# Patient Record
Sex: FEMALE | Race: WHITE | Employment: FULL TIME | URBAN - METROPOLITAN AREA
[De-identification: names, ages, dates, MRNs, and addresses within clinical notes are randomized per-mention and may not be internally consistent; named-entity substitution may affect disease eponyms.]

---

## 2017-10-31 PROCEDURE — 88175 CYTOPATH C/V AUTO FLUID REDO: CPT | Performed by: OBSTETRICS & GYNECOLOGY

## 2018-12-18 PROCEDURE — 88175 CYTOPATH C/V AUTO FLUID REDO: CPT | Performed by: OBSTETRICS & GYNECOLOGY

## 2024-03-18 NOTE — DISCHARGE INSTRUCTIONS
HOME INSTRUCTIONS    Hysteroscopy  Hysteroscopy is a procedure done to see inside your uterus. It can help find the cause of problems in the uterus. This helps your healthcare provider decide on the best treatment. In some cases, it can be used to perform treatment. Hysteroscopy may be done in your healthcare provider's office, outpatient surgery center, or in the hospital.    Why might I need hysteroscopy?  Hysteroscopy may be done based on the results of other tests. It can help find the cause of problems. These can include:  Unusually heavy or long menstrual periods  Bleeding between periods  Postmenopausal bleeding  Trouble becoming pregnant (infertility) or carrying a pregnancy to term  To locate an IUD (intrauterine device)  What are the risks and possible complications of hysteroscopy?  Problems with the procedure are rare. But all procedures have risks. Risks of hysteroscopy include:  Infection  Bleeding  Tearing of the uterine wall  Damage to internal organs  Scarring of the uterus  Fluid overload  Problems with anesthesia, the medicine that prevents pain during the procedure  How do I get ready for hysteroscopy?  Tell your healthcare provider if you have any health problems. These include diabetes, high blood pressure, heart disease, or bleeding problems.  Tell your healthcare provider about all the medicines you take. This includes any over-the-counter medicines, prescription medicines, vitamins, herbs, or supplements.  You may be told not to use vaginal creams or medicine. And you may be told not to have sex or wash out your vagina (douche).  Follow any directions you are given for not eating or drinking before procedure.  You may be tested for pregnancy and infection.  You may be asked to sign a consent form.  You may be given a pain reliever to take an hour before the procedure. This helps relieve cramping that may occur.    What happens during a hysteroscopy?  You’ll lie on an exam table with your  feet in stirrups.  You may be given general anesthesia or medicines to help you relax or sleep. In some cases, an IV (intravenous) line will be put into a vein in your arm or hand. This line is then used to give fluids and medicines.  A tool called a speculum is inserted into the vagina to hold it open. A tool called a dilator may be used to widen the cervix.  Numbing medicine may be applied to the cervix.  A long, thin lighted tube with a camera attached (hysteroscope) is inserted through the vagina and into the uterus. It's used to see inside the uterus. Images of the uterus are viewed on a monitor.  A gas or fluid may be injected into the uterus to expand it.  Other tools may be put through the hysteroscope. These are used to take tissue samples or remove growths.    What happens after hysteroscopy?  You may have cramps and bleeding for short time after the procedure. This is normal. Use pads instead of tampons.  Don't douche or use tampons until your healthcare provider says it’s OK.  Don't use any vaginal medicines until you are told it’s OK.  Ask your healthcare provider when it’s OK to have sex again.    When to call your healthcare provider  Call your healthcare provider if any of the following occur:  Heavy bleeding (more than 1 pad an hour for 2 or more hours)  A fever of 100.4°F ( 38.0°C) or higher, or as directed by your provider  Increasing belly (abdominal) pain or soreness  Bad-smelling discharge  Follow-up care  Schedule a follow-up visit with your healthcare provider. Based on your test results, you may need more treatment. Be sure to follow directions and keep your appointments.  Imindi last reviewed this educational content on 8/1/2022 © 2000-2023 [employer]. All rights reserved. This information is not intended as a substitute for professional medical care. Always follow your healthcare professional's instructions.        AMBSURG HOME CARE INSTRUCTIONS: POST-OP ANESTHESIA  The  medication that you received for sedation or general anesthesia can last up to 24 hours. Your judgment and reflexes may be altered, even if you feel like your normal self.      We Recommend:   Do not drive any motor vehicle or bicycle   Avoid mowing the lawn, playing sports, or working with power tools/applicances (power saws, electric knives or mixers)   That you have someone stay with you on your first night home   Do not drink alcohol or take sleeping pills or tranquilizers   Do not sign legal documents within 24 hours of your procedure   If you had a nerve block for your surgery, take extra care not to put any pressure on your arm or hand for 24 hours    It is normal:  For you to have a sore throat if you had a breathing tube during surgery (while you were asleep!). The sore throat should get better within 48 hours. You can gargle with warm salt water (1/2 tsp in 4 oz warm water) or use a throat lozenge for comfort  To feel muscle aches or soreness especially in the abdomen, chest or neck. The achy feeling should go away in the next 24 hours  To feel weak, sleepy or \"wiped out\". Your should start feeling better in the next 24 hours.   To experience mild discomforts such as sore lip or tongue, headache, cramps, gas pains or a bloated feeling in your abdomen.   To experience mild back pain or soreness for a day or two if you had spinal or epidural anesthesia.   If you had laparoscopic surgery, to feel shoulder pain or discomfort on the day of surgery.   For some patients to have nausea after surgery/anesthesia    If you feel nausea or experience vomiting:   Try to move around less.   Eat less than usual or drink only liquids until the next morning   Nausea should resolve in about 24 hours    If you have a problem when you are at home:    Call your surgeons office   Discharge Instructions: After Your Surgery  You’ve just had surgery. During surgery, you were given medicine called anesthesia to keep you relaxed and  free of pain. After surgery, you may have some pain or nausea. This is common. Here are some tips for feeling better and getting well after surgery.   Going home  Your healthcare provider will show you how to take care of yourself when you go home. They'll also answer your questions. Have an adult family member or friend drive you home. For the first 24 hours after your surgery:   Don't drive or use heavy equipment.  Don't make important decisions or sign legal papers.  Take medicines as directed.  Don't drink alcohol.  Have someone stay with you, if needed. They can watch for problems and help keep you safe.  Be sure to go to all follow-up visits with your healthcare provider. And rest after your surgery for as long as your provider tells you to.   Coping with pain  If you have pain after surgery, pain medicine will help you feel better. Take it as directed, before pain becomes severe. Also, ask your healthcare provider or pharmacist about other ways to control pain. This might be with heat, ice, or relaxation. And follow any other instructions your surgeon or nurse gives you.      Stay on schedule with your medicine.     Tips for taking pain medicine  To get the best relief possible, remember these points:   Pain medicines can upset your stomach. Taking them with a little food may help.  Most pain relievers taken by mouth need at least 20 to 30 minutes to start to work.  Don't wait till your pain becomes severe before you take your medicine. Try to time your medicine so that you can take it before starting an activity. This might be before you get dressed, go for a walk, or sit down for dinner.  Constipation is a common side effect of some pain medicines. Call your healthcare provider before taking any medicines such as laxatives or stool softeners to help ease constipation. Also ask if you should skip any foods. Drinking lots of fluids and eating foods such as fruits and vegetables that are high in fiber can also  help. Remember, don't take laxatives unless your surgeon has prescribed them.  Drinking alcohol and taking pain medicine can cause dizziness and slow your breathing. It can even be deadly. Don't drink alcohol while taking pain medicine.  Pain medicine can make you react more slowly to things. Don't drive or run machinery while taking pain medicine.  Your healthcare provider may tell you to take acetaminophen to help ease your pain. Ask them how much you're supposed to take each day. Acetaminophen or other pain relievers may interact with your prescription medicines or other over-the-counter (OTC) medicines. Some prescription medicines have acetaminophen and other ingredients in them. Using both prescription and OTC acetaminophen for pain can cause you to accidentally overdose. Read the labels on your OTC medicines with care. This will help you to clearly know the list of ingredients, how much to take, and any warnings. It may also help you not take too much acetaminophen. If you have questions or don't understand the information, ask your pharmacist or healthcare provider to explain it to you before you take the OTC medicine.   Managing nausea  Some people have an upset stomach (nausea) after surgery. This is often because of anesthesia, pain, or pain medicine, less movement of food in the stomach, or the stress of surgery. These tips will help you handle nausea and eat healthy foods as you get better. If you were on a special food plan before surgery, ask your healthcare provider if you should follow it while you get better. Check with your provider on how your eating should progress. It may depend on the surgery you had. These general tips may help:   Don't push yourself to eat. Your body will tell you when to eat and how much.  Start off with clear liquids and soup. They're easier to digest.  Next try semi-solid foods as you feel ready. These include mashed potatoes, applesauce, and gelatin.  Slowly move to solid  foods. Don’t eat fatty, rich, or spicy foods at first.  Don't force yourself to have 3 large meals a day. Instead eat smaller amounts more often.  Take pain medicines with a small amount of solid food, such as crackers or toast. This helps prevent nausea.  When to call your healthcare provider  Call your healthcare provider right away if you have any of these:   You still have too much pain, or the pain gets worse, after taking the medicine. The medicine may not be strong enough. Or there may be a complication from the surgery.  You feel too sleepy, dizzy, or groggy. The medicine may be too strong.  Side effects such as nausea or vomiting. Your healthcare provider may advise taking other medicines to .  Skin changes such as rash, itching, or hives. This may mean you have an allergic reaction. Your provider may advise taking other medicines.  The incision looks different (for instance, part of it opens up).  Bleeding or fluid leaking from the incision site, and weren't told to expect that.  Fever of 100.4°F (38°C) or higher, or as directed by your provider.  Call 911  Call 911 right away if you have:   Trouble breathing  Facial swelling    If you have obstructive sleep apnea   You were given anesthesia medicine during surgery to keep you comfortable and free of pain. After surgery, you may have more apnea spells because of this medicine and other medicines you were given. The spells may last longer than normal.    At home:  Keep using the continuous positive airway pressure (CPAP) device when you sleep. Unless your healthcare provider tells you not to, use it when you sleep, day or night. CPAP is a common device used to treat obstructive sleep apnea.  Talk with your provider before taking any pain medicine, muscle relaxants, or sedatives. Your provider will tell you about the possible dangers of taking these medicines.  Contact your provider if your sleeping changes a lot even when taking medicines as  nacho Leiva last reviewed this educational content on 10/1/2021  © 4869-6199 [employer]. All rights reserved. This information is not intended as a substitute for professional medical care. Always follow your healthcare professional's instructions.

## 2024-03-19 ENCOUNTER — ANESTHESIA (OUTPATIENT)
Dept: SURGERY | Facility: HOSPITAL | Age: 59
End: 2024-03-19
Payer: COMMERCIAL

## 2024-03-19 ENCOUNTER — HOSPITAL ENCOUNTER (OUTPATIENT)
Facility: HOSPITAL | Age: 59
Setting detail: HOSPITAL OUTPATIENT SURGERY
Discharge: HOME | End: 2024-03-19
Attending: OBSTETRICS & GYNECOLOGY | Admitting: OBSTETRICS & GYNECOLOGY
Payer: COMMERCIAL

## 2024-03-19 ENCOUNTER — ANESTHESIA EVENT (OUTPATIENT)
Dept: SURGERY | Facility: HOSPITAL | Age: 59
End: 2024-03-19
Payer: COMMERCIAL

## 2024-03-19 VITALS
WEIGHT: 168 LBS | DIASTOLIC BLOOD PRESSURE: 54 MMHG | HEART RATE: 51 BPM | OXYGEN SATURATION: 100 % | HEIGHT: 66 IN | BODY MASS INDEX: 27 KG/M2 | SYSTOLIC BLOOD PRESSURE: 107 MMHG | TEMPERATURE: 99 F | RESPIRATION RATE: 16 BRPM

## 2024-03-19 DIAGNOSIS — Z98.890 POST-OPERATIVE STATE: Primary | ICD-10-CM

## 2024-03-19 PROCEDURE — 88305 TISSUE EXAM BY PATHOLOGIST: CPT | Performed by: OBSTETRICS & GYNECOLOGY

## 2024-03-19 PROCEDURE — 0UDB8ZX EXTRACTION OF ENDOMETRIUM, VIA NATURAL OR ARTIFICIAL OPENING ENDOSCOPIC, DIAGNOSTIC: ICD-10-PCS | Performed by: OBSTETRICS & GYNECOLOGY

## 2024-03-19 RX ORDER — SODIUM CHLORIDE, SODIUM LACTATE, POTASSIUM CHLORIDE, CALCIUM CHLORIDE 600; 310; 30; 20 MG/100ML; MG/100ML; MG/100ML; MG/100ML
INJECTION, SOLUTION INTRAVENOUS CONTINUOUS
Status: DISCONTINUED | OUTPATIENT
Start: 2024-03-19 | End: 2024-03-19

## 2024-03-19 RX ORDER — HYDROMORPHONE HYDROCHLORIDE 1 MG/ML
0.6 INJECTION, SOLUTION INTRAMUSCULAR; INTRAVENOUS; SUBCUTANEOUS EVERY 5 MIN PRN
Status: DISCONTINUED | OUTPATIENT
Start: 2024-03-19 | End: 2024-03-19

## 2024-03-19 RX ORDER — HYDROMORPHONE HYDROCHLORIDE 1 MG/ML
0.4 INJECTION, SOLUTION INTRAMUSCULAR; INTRAVENOUS; SUBCUTANEOUS EVERY 5 MIN PRN
Status: DISCONTINUED | OUTPATIENT
Start: 2024-03-19 | End: 2024-03-19

## 2024-03-19 RX ORDER — MIDAZOLAM HYDROCHLORIDE 1 MG/ML
INJECTION INTRAMUSCULAR; INTRAVENOUS AS NEEDED
Status: DISCONTINUED | OUTPATIENT
Start: 2024-03-19 | End: 2024-03-19 | Stop reason: SURG

## 2024-03-19 RX ORDER — LIDOCAINE HYDROCHLORIDE 10 MG/ML
INJECTION, SOLUTION EPIDURAL; INFILTRATION; INTRACAUDAL; PERINEURAL AS NEEDED
Status: DISCONTINUED | OUTPATIENT
Start: 2024-03-19 | End: 2024-03-19 | Stop reason: SURG

## 2024-03-19 RX ORDER — NALOXONE HYDROCHLORIDE 0.4 MG/ML
0.08 INJECTION, SOLUTION INTRAMUSCULAR; INTRAVENOUS; SUBCUTANEOUS AS NEEDED
Status: DISCONTINUED | OUTPATIENT
Start: 2024-03-19 | End: 2024-03-19

## 2024-03-19 RX ORDER — ONDANSETRON 2 MG/ML
INJECTION INTRAMUSCULAR; INTRAVENOUS AS NEEDED
Status: DISCONTINUED | OUTPATIENT
Start: 2024-03-19 | End: 2024-03-19 | Stop reason: SURG

## 2024-03-19 RX ORDER — IBUPROFEN 600 MG/1
600 TABLET ORAL EVERY 8 HOURS PRN
Qty: 10 TABLET | Refills: 0 | Status: SHIPPED | OUTPATIENT
Start: 2024-03-19

## 2024-03-19 RX ORDER — HYDROMORPHONE HYDROCHLORIDE 1 MG/ML
0.2 INJECTION, SOLUTION INTRAMUSCULAR; INTRAVENOUS; SUBCUTANEOUS EVERY 5 MIN PRN
Status: DISCONTINUED | OUTPATIENT
Start: 2024-03-19 | End: 2024-03-19

## 2024-03-19 RX ORDER — ACETAMINOPHEN 500 MG
1000 TABLET ORAL ONCE
Status: COMPLETED | OUTPATIENT
Start: 2024-03-19 | End: 2024-03-19

## 2024-03-19 RX ORDER — IBUPROFEN 600 MG/1
600 TABLET ORAL ONCE
Status: DISCONTINUED | OUTPATIENT
Start: 2024-03-19 | End: 2024-03-19

## 2024-03-19 RX ORDER — MORPHINE SULFATE 4 MG/ML
2 INJECTION, SOLUTION INTRAMUSCULAR; INTRAVENOUS EVERY 10 MIN PRN
Status: DISCONTINUED | OUTPATIENT
Start: 2024-03-19 | End: 2024-03-19

## 2024-03-19 RX ORDER — DEXAMETHASONE SODIUM PHOSPHATE 4 MG/ML
VIAL (ML) INJECTION AS NEEDED
Status: DISCONTINUED | OUTPATIENT
Start: 2024-03-19 | End: 2024-03-19 | Stop reason: SURG

## 2024-03-19 RX ORDER — HYDROCODONE BITARTRATE AND ACETAMINOPHEN 5; 325 MG/1; MG/1
1-2 TABLET ORAL EVERY 6 HOURS PRN
Qty: 5 TABLET | Refills: 0 | Status: SHIPPED | OUTPATIENT
Start: 2024-03-19

## 2024-03-19 RX ORDER — MORPHINE SULFATE 10 MG/ML
6 INJECTION, SOLUTION INTRAMUSCULAR; INTRAVENOUS EVERY 10 MIN PRN
Status: DISCONTINUED | OUTPATIENT
Start: 2024-03-19 | End: 2024-03-19

## 2024-03-19 RX ORDER — MORPHINE SULFATE 4 MG/ML
4 INJECTION, SOLUTION INTRAMUSCULAR; INTRAVENOUS EVERY 10 MIN PRN
Status: DISCONTINUED | OUTPATIENT
Start: 2024-03-19 | End: 2024-03-19

## 2024-03-19 RX ORDER — KETOROLAC TROMETHAMINE 30 MG/ML
INJECTION, SOLUTION INTRAMUSCULAR; INTRAVENOUS AS NEEDED
Status: DISCONTINUED | OUTPATIENT
Start: 2024-03-19 | End: 2024-03-19 | Stop reason: SURG

## 2024-03-19 RX ADMIN — KETOROLAC TROMETHAMINE 30 MG: 30 INJECTION, SOLUTION INTRAMUSCULAR; INTRAVENOUS at 10:55:00

## 2024-03-19 RX ADMIN — LIDOCAINE HYDROCHLORIDE 25 MG: 10 INJECTION, SOLUTION EPIDURAL; INFILTRATION; INTRACAUDAL; PERINEURAL at 10:30:00

## 2024-03-19 RX ADMIN — MIDAZOLAM HYDROCHLORIDE 2 MG: 1 INJECTION INTRAMUSCULAR; INTRAVENOUS at 10:27:00

## 2024-03-19 RX ADMIN — DEXAMETHASONE SODIUM PHOSPHATE 4 MG: 4 MG/ML VIAL (ML) INJECTION at 10:48:00

## 2024-03-19 RX ADMIN — ONDANSETRON 4 MG: 2 INJECTION INTRAMUSCULAR; INTRAVENOUS at 10:48:00

## 2024-03-19 NOTE — H&P
[facility]  part of [facility]    History & Physical    Rhina Rubio Patient Status:  Hospital Outpatient Surgery    1965 MRN [id number]   Location [facility] PRE OP RECOVERY Attending Rubi Crowder MD   Hosp Day # 0 PCP Shannon Valencia MD     Date:  3/19/2024    History provided by:patient    Chief Complaint:   PMB      HPI:   Rhina Rbuio is a(n) 59 year old  female Patient's last menstrual period was 10/01/2015 (exact date). who presents with PMB for D&C/hysteroscopy/possible polypectomy.      DATE OF SERVICE: 2024  US PELVIS (TRANSABDOMINAL AND TRANSVAGINAL) (CPT=76856/15020)    CLINICAL INDICATION:  PMB (postmenopausal bleeding). LMP:    COMPARISON: None available.    TECHNIQUE: Both transabdominal and transvaginal ultrasound examination of the pelvis were performed.    FINDINGS:    UTERUS:    The uterus is anteverted and measures 5.6 x 3.1 x 2.4 cm. Total volume is 22.2 mL.  The myometrium is heterogeneous. An intramural fundal myoma measures 0.8 x 1.0 x 1.0 cm with no  Doppler flow. It has an ill-defined hypoechoic appearance. If warranted, consider MRI of the uterus  with and without contrast for further evaluation. It was not present on the previous examination.  The endometrial echo complex measures up to 1.4 mm. The endometrium is ill defined with a  questionable hyperechoic focus within it suggesting a possible polyp versus blood products. This  cannot be evaluated any further based on this study and would also benefit by MRI of the uterus with  and without contrast.    RIGHT OVARY:  The right ovary was not visualized.      LEFT OVARY:  The left ovary was not visualized.      There is no significant free fluid.      DATE OF SERVICE: 2024   PROCEDURE: MRI PELVIS (SOFT TISSUE) (W+WO) (CPT=72197)     HISTORY: Postmenopausal bleeding     TECHNIQUE: MR images of the pelvis using axial and coronal T2 HASTE, axial in and out of phase T1   GRE,  axial diffusion and ADC, axial T2 fat suppressed,  high-resolution small field-of-view T2 of   the pelvis, and GRE T1 fat suppressed images prior to and following contrast were obtained per the   \"Female Pelvis\" protocol.     Vial size:  7.5 mL        Injected amount:  7.5 mL          Discarded amount:  0 mL     ADVERSE REACTION: None.     COMPARISON: None.     FINDINGS:     UTERUS: Measures 6.5 x 2.2 x 3.3 cm (vol: 24.69 cc). T2 hypointense myometrial masses compatible   with leiomyomata are not visualized.   ENDOMETRIUM: The endometrial lining is not thickened measuring. There is a subtle ill-defined 0.5 cm   focus of enhancement in the endometrial lining measuring 0.2 cm (series 1101, image 52 and series   1201, image 51). No corresponding diffusion restriction or T2 signal abnormality is identified.   JUNCTIONAL ZONE: Within normal limits.   CERVIX: There is normal preservation of the cervical low signal stroma with no discrete cervical   mass.  Following contrast, there is normal enhancement of the myometrium and cervix.  The   parametrium is normal.   ADNEXA: No suspicious adnexal mass is seen. The bilateral ovaries are visualized.   PERITONEUM:  Precontrast fat suppressed T1 weighted images demonstrate no evidence for hemorrhagic   foci in the pelvis. There is a small volume free fluid in the pelvis.   LYMPH NODES: There are no enlarged pelvic lymph nodes.   BONES: No aggressive-appearing osseous pelvic lesions are seen.     History     Past Medical History:   Diagnosis Date    CERVICAL DYSPLASIA      Past Surgical History:   Procedure Laterality Date    COLONOSCOPY  14 - SABRINA Perkins    hemorrhoids, repeat .          x1     Family History   Problem Relation Age of Onset    Other (Other) Mother         thyroid disorder    Cancer Paternal Grandmother         breast?    Diabetes Paternal Grandmother     Breast Cancer Paternal Grandmother 60        age at dx 60     Social History:  Social History      Socioeconomic History    Marital status:    Tobacco Use    Smoking status: Former    Smokeless tobacco: Never   Vaping Use    Vaping Use: Never used   Substance and Sexual Activity    Alcohol use: No     Alcohol/week: 0.8 standard drinks of alcohol     Types: 1 drink(s) per week     Comment: occasional    Drug use: No       Allergies/Medications:   Allergies:   Allergies   Allergen Reactions    Amoxil HIVES    Pcns [Penicillins] HIVES     No skin blisters or peeling  No organ failure     Medications Prior to Admission   Medication Sig    Ascorbic Acid (STEVEN-C OR) Take by mouth.    MAGNESIUM OR Take by mouth.    Turmeric (QC TUMERIC COMPLEX OR) Take by mouth.    Probiotic Product (PROBIOTIC DAILY OR) Take by mouth.    B Complex Vitamins (B COMPLEX OR) Take by mouth.    Cholecalciferol (VITAMIN D) 1000 units Oral Tab Take by mouth.    Calcium Carbonate-Vitamin D (CALCIUM + D OR) Take  by mouth.    Omega-3 Fatty Acids (FISH OIL OR) Take  by mouth.    Multiple Vitamins-Minerals (MULTIVITAMIN OR) Take  by mouth.    hydrocortisone 2.5 % External Ointment Apply to affected area BID x 7 days then prn       Review of Systems:   Pertinent items are noted in HPI.    Physical Exam:   Vital Signs:  Blood pressure 114/58, pulse 60, temperature 98.4 °F (36.9 °C), temperature source Oral, resp. rate 18, height 5' 6\" (1.676 m), weight 168 lb (76.2 kg), last menstrual period 10/01/2015, SpO2 98%.     GENERAL: well developed, well nourished, in no apparent distress  RESPIRATORY: clear to auscultation  CARDIOVASCULAR: S1, S2 normal, RRR  ABDOMEN: Soft, nondistended; nontender  GYNE/: deferred to OR    LYMPHATIC: no lymphadenopathy  EXTREMITIES:  non tender without edema  NEUROLOGIC: intact  PSYCHIATRIC: alert and oriented x 3; affect appropriate      Results:     Lab Results   Component Value Date    WBC 4.69 02/20/2021    HGB 13.6 02/20/2021    HCT 41.7 02/20/2021     02/20/2021    CREATSERUM 0.81 02/20/2021    BUN  19.0 02/20/2021     02/20/2021    K 4.65 02/20/2021     02/20/2021    CO2 28.6 02/20/2021    GLU 85 02/20/2021    CA 9.3 02/20/2021    ALB 4.4 02/20/2021    ALKPHO 83 02/20/2021    BILT 1.09 02/20/2021    TP 6.7 02/20/2021    AST 25 02/20/2021    ALT 19 02/20/2021    T4F 1.49 12/21/2018    TSH 2.320 02/20/2021       No results found.        Assessment/Plan:   PMB with possible polyp on both ultrasound and MRI. Plan for D&C/hysteroscopy/polypectomy. Risks/benefits/alternatives discussed including risks of infection, bleeding, and uterine perforation with damage to internal organs like bowel and bladder. Questions answered and informed consent obtained.      Rubi Crowder MD  3/19/2024

## 2024-03-19 NOTE — OPERATIVE REPORT
[facility]  part of [facility]    Gyne Operative Note    Rhina Rubio Patient Status:  Hospital Outpatient Surgery    1965 MRN [id number]   Location [facility] OPERATING ROOM Attending Rubi Crowder MD   Hosp Day # 0 PCP Shannon Valencia MD     Preoperative Diagnosis: PMB, cervical stenosis, suspected endometrial polyp    Postoperative Diagnosis: Same as pre-op, atrophic endometrial cavity    Procedures: Procedure(s):  Hysteroscopy dilation and curettage    Primary Surgeon: Rubi Crowder MD    Assistant: none    Anesthesia: LMA    Estimated Blood Loss: 1cc    Antibiotics:  none    Complications: none    Specimens: Endometrial curettings       Surgical Findings: cervical stenosis, endometrial cavity without any gross lesions, atrophic, bilateral ostea visualized    Condition: stable    Procedure Note:  Patient taken to the OR where she was placed under general anesthesia, prepped and draped in the normal usual sterile manner in dorsal lithotomy position.  Bladder was emptied via straight catheter.  Pelvic exam done which confirmed anterverted uterus. Weighted speculum placed in posterior vagina. Cervix grasped with single tooth tenaculum at 12 o'clock position.  Cervical stenosis encountered. Cervix was gently dilated using half dilators. The hysteroscope placed into cavity.  Inspection revealed normal ostia, normal cavity without any lesions visualized. Hysteroscope was removed. A gentle curettage was performed in all quadrants and endometrial curettings were sent to pathology. Single tooth tenaculum was removed. AgNO3 used to make site of application hemostatic.  Patient was taken to the recovery room in good condition.  All sponge, needle count were correct x 2.      Rubi Crowder MD  3/19/2024

## 2024-03-19 NOTE — ANESTHESIA POSTPROCEDURE EVALUATION
Patient: Rhina Rubio    Procedure Summary       Date: 03/19/24 Room / Location: [facility] MAIN OR  / [facility] MAIN OR    Anesthesia Start: 1026 Anesthesia Stop: 1113    Procedure: Hysteroscopy dilation and curettage Diagnosis:       Cervical stenosis (uterine cervix)      (Endometrial polyp)    Surgeons: Rubi Crowder MD Anesthesiologist: Gerardo Felix MD    Anesthesia Type: general ASA Status: 1            Anesthesia Type: general    Vitals Value Taken Time   /66 03/19/24 1107   Temp 97.8 03/19/24 1113   Pulse 68 03/19/24 1112   Resp 22 03/19/24 1112   SpO2 100 % 03/19/24 1112   Vitals shown include unfiled device data.    [facility] AN Post Evaluation:   Patient Evaluated in PACU  Patient Participation: complete - patient participated  Level of Consciousness: awake  Pain Score: 2  Pain Management: adequate  Airway Patency:patent  Dental exam unchanged from preop  Yes    Cardiovascular Status: hemodynamically stable  Respiratory Status: spontaneous ventilation  Postoperative Hydration euvolemic      Gerardo Felix MD  3/19/2024 11:13 AM

## 2024-03-19 NOTE — ANESTHESIA PROCEDURE NOTES
Airway  Date/Time: 3/19/2024 10:32 AM  Urgency: Elective      General Information and Staff    Patient location during procedure: OR  Anesthesiologist: Gerardo Felix MD  Performed: anesthesiologist   Performed by: Gerardo Felix MD  Authorized by: Gerardo Felix MD      Indications and Patient Condition  Indications for airway management: anesthesia  Sedation level: deep  Preoxygenated: yes  Patient position: sniffing  Mask difficulty assessment: 1 - vent by mask    Final Airway Details  Final airway type: supraglottic airway      Successful airway: classic  Size 4       Number of attempts at approach: 1

## 2024-03-19 NOTE — ANESTHESIA PREPROCEDURE EVALUATION
Anesthesia PreOp Note    HPI:     Rhina Rubio is a 59 year old female who presents for preoperative consultation requested by: Rubi Crowder MD    Date of Surgery: 3/19/2024    Procedure(s):  Hysteroscopy dilation and curettage, polypectomy  Indication: Endometrial polyp    Relevant Problems   No relevant active problems       NPO:                         History Review:  Patient Active Problem List    Diagnosis Date Noted    Mixed incontinence urge and stress (male)(female) 2016    Dysphagia 10/30/2014       Past Medical History:   Diagnosis Date    CERVICAL DYSPLASIA        Past Surgical History:   Procedure Laterality Date    COLONOSCOPY  14 - SABRINA Perkins    hemorrhoids, repeat .          x1       Medications Prior to Admission   Medication Sig Dispense Refill Last Dose    Ascorbic Acid (STEVEN-C OR) Take by mouth.       MAGNESIUM OR Take by mouth.       Turmeric (QC TUMERIC COMPLEX OR) Take by mouth.       Probiotic Product (PROBIOTIC DAILY OR) Take by mouth.       hydrocortisone 2.5 % External Ointment Apply to affected area BID x 7 days then prn 20 g 1     B Complex Vitamins (B COMPLEX OR) Take by mouth.       Cholecalciferol (VITAMIN D) 1000 units Oral Tab Take by mouth.       Calcium Carbonate-Vitamin D (CALCIUM + D OR) Take  by mouth.       Omega-3 Fatty Acids (FISH OIL OR) Take  by mouth.       Multiple Vitamins-Minerals (MULTIVITAMIN OR) Take  by mouth.        Current Facility-Administered Medications Ordered in Epic   Medication Dose Route Frequency Provider Last Rate Last Admin    lactated ringers infusion   Intravenous Continuous Rubi Crowder MD        acetaminophen (Tylenol Extra Strength) tab 1,000 mg  1,000 mg Oral Once Rubi Crowder MD         No current [facility]-ordered outpatient medications on file.       Allergies   Allergen Reactions    Amoxil HIVES    Pcns [Penicillins] HIVES     No skin blisters or peeling  No organ failure       Family History   Problem Relation  Age of Onset    Other (Other) Mother         thyroid disorder    Cancer Paternal Grandmother         breast?    Diabetes Paternal Grandmother     Breast Cancer Paternal Grandmother 60        age at dx 60     Social History     Socioeconomic History    Marital status:    Tobacco Use    Smoking status: Former    Smokeless tobacco: Never   Vaping Use    Vaping Use: Never used   Substance and Sexual Activity    Alcohol use: No     Alcohol/week: 0.8 standard drinks of alcohol     Types: 1 drink(s) per week     Comment: occasional    Drug use: No       Available pre-op labs reviewed.             Vital Signs:  Body mass index is 26.63 kg/m².   height is 1.676 m (5' 6\") and weight is 74.8 kg (165 lb).   Vitals:    03/15/24 1004   Weight: 74.8 kg (165 lb)   Height: 1.676 m (5' 6\")        Anesthesia Evaluation      Airway   Mallampati: II  TM distance: >3 FB  Neck ROM: full  Dental      Pulmonary - negative ROS and normal exam   Cardiovascular - negative ROS and normal exam  Exercise tolerance: good    Neuro/Psych - negative ROS     GI/Hepatic/Renal - negative ROS     Endo/Other - negative ROS   Abdominal  - normal exam                 Anesthesia Plan:   ASA:  1  Plan:   General  Plan Comments: Anesthesia plan was discussed with the patient, going through the rationale, expectations, benefits and risks namely injury to lips, teeth, gyms or corneas, risks of an allergic reaction, risk of awareness or recall of intra-operative events, risk of prolonged intubation and ICU admission, risks of kidney failure, risk of coronary events or dysrhythmias, risk of cerebrovascular events or stroke and on rare occasions death.         I have informed Rhina GUILLERMO Ramiro and/or legal guardian or family member of the nature of the anesthetic plan, benefits, risks including possible dental damage if relevant, major complications, and any alternative forms of anesthetic management.   All of the patient's questions were answered to the best  of my ability. The patient desires the anesthetic management as planned.  Gerardo Felix MD  3/19/2024 8:49 AM  Present on Admission:  **None**

## (undated) DEVICE — 1016 S-DRAPE IRRIG POUCH 10/BOX: Brand: STERI-DRAPE™

## (undated) DEVICE — GAMMEX® PI HYBRID SIZE 6.5, STERILE POWDER-FREE SURGICAL GLOVE, POLYISOPRENE AND NEOPRENE BLEND: Brand: GAMMEX

## (undated) DEVICE — SOLUTION IRRIG 3000ML 0.9% NACL FLX CONT

## (undated) DEVICE — MEDI-VAC NON-CONDUCTIVE SUCTION TUBING: Brand: CARDINAL HEALTH

## (undated) DEVICE — CANISTER SUCT 3000CC HI FLO DISP FOR FLD MGMT

## (undated) DEVICE — HYSTEROSCOPY: Brand: MEDLINE INDUSTRIES, INC.